# Patient Record
Sex: FEMALE | Race: WHITE | HISPANIC OR LATINO | Employment: UNEMPLOYED | URBAN - METROPOLITAN AREA
[De-identification: names, ages, dates, MRNs, and addresses within clinical notes are randomized per-mention and may not be internally consistent; named-entity substitution may affect disease eponyms.]

---

## 2017-01-06 ENCOUNTER — APPOINTMENT (OUTPATIENT)
Dept: CT IMAGING | Facility: CLINIC | Age: 22
End: 2017-01-06
Attending: EMERGENCY MEDICINE
Payer: COMMERCIAL

## 2017-01-06 ENCOUNTER — HOSPITAL ENCOUNTER (EMERGENCY)
Facility: CLINIC | Age: 22
Discharge: HOME | End: 2017-01-06
Attending: EMERGENCY MEDICINE | Admitting: EMERGENCY MEDICINE
Payer: COMMERCIAL

## 2017-01-06 ENCOUNTER — APPOINTMENT (OUTPATIENT)
Dept: ULTRASOUND IMAGING | Facility: CLINIC | Age: 22
End: 2017-01-06
Attending: EMERGENCY MEDICINE
Payer: COMMERCIAL

## 2017-01-06 VITALS
BODY MASS INDEX: 18.14 KG/M2 | OXYGEN SATURATION: 100 % | TEMPERATURE: 98.2 F | HEIGHT: 59 IN | SYSTOLIC BLOOD PRESSURE: 133 MMHG | RESPIRATION RATE: 18 BRPM | WEIGHT: 90 LBS | DIASTOLIC BLOOD PRESSURE: 80 MMHG | HEART RATE: 101 BPM

## 2017-01-06 DIAGNOSIS — N10 ACUTE PYELONEPHRITIS: ICD-10-CM

## 2017-01-06 DIAGNOSIS — R10.84 ABDOMINAL PAIN, GENERALIZED: ICD-10-CM

## 2017-01-06 DIAGNOSIS — R16.0 HEPATOMEGALY: ICD-10-CM

## 2017-01-06 DIAGNOSIS — N83.201 CYST OF RIGHT OVARY: ICD-10-CM

## 2017-01-06 LAB
ALBUMIN SERPL-MCNC: 4 G/DL (ref 3.4–5)
ALBUMIN UR-MCNC: 30 MG/DL
ALP SERPL-CCNC: 75 U/L (ref 40–150)
ALT SERPL W P-5'-P-CCNC: 22 U/L (ref 0–50)
ANION GAP SERPL CALCULATED.3IONS-SCNC: 9 MMOL/L (ref 3–14)
APPEARANCE UR: ABNORMAL
AST SERPL W P-5'-P-CCNC: 18 U/L (ref 0–45)
BACTERIA #/AREA URNS HPF: ABNORMAL /HPF
BASOPHILS # BLD AUTO: 0 10E9/L (ref 0–0.2)
BASOPHILS NFR BLD AUTO: 0.2 %
BILIRUB SERPL-MCNC: 0.7 MG/DL (ref 0.2–1.3)
BILIRUB UR QL STRIP: NEGATIVE
BUN SERPL-MCNC: 13 MG/DL (ref 7–30)
CALCIUM SERPL-MCNC: 9.4 MG/DL (ref 8.5–10.1)
CHLORIDE SERPL-SCNC: 102 MMOL/L (ref 94–109)
CO2 SERPL-SCNC: 26 MMOL/L (ref 20–32)
COLOR UR AUTO: YELLOW
CREAT BLD-MCNC: 0.8 MG/DL (ref 0.52–1.04)
CREAT SERPL-MCNC: 0.7 MG/DL (ref 0.52–1.04)
DIFFERENTIAL METHOD BLD: NORMAL
EOSINOPHIL # BLD AUTO: 0 10E9/L (ref 0–0.7)
EOSINOPHIL NFR BLD AUTO: 0.3 %
ERYTHROCYTE [DISTWIDTH] IN BLOOD BY AUTOMATED COUNT: 12.7 % (ref 10–15)
GFR SERPL CREATININE-BSD FRML MDRD: >90 ML/MIN/1.7M2
GFR SERPL CREATININE-BSD FRML MDRD: ABNORMAL ML/MIN/1.7M2
GLUCOSE SERPL-MCNC: 75 MG/DL (ref 70–99)
GLUCOSE UR STRIP-MCNC: NEGATIVE MG/DL
HCG UR QL: NEGATIVE
HCT VFR BLD AUTO: 40.5 % (ref 35–47)
HGB BLD-MCNC: 13.3 G/DL (ref 11.7–15.7)
HGB UR QL STRIP: NEGATIVE
IMM GRANULOCYTES # BLD: 0 10E9/L (ref 0–0.4)
IMM GRANULOCYTES NFR BLD: 0.3 %
KETONES UR STRIP-MCNC: 40 MG/DL
LEUKOCYTE ESTERASE UR QL STRIP: ABNORMAL
LYMPHOCYTES # BLD AUTO: 1.7 10E9/L (ref 0.8–5.3)
LYMPHOCYTES NFR BLD AUTO: 17.6 %
MCH RBC QN AUTO: 30.9 PG (ref 26.5–33)
MCHC RBC AUTO-ENTMCNC: 32.8 G/DL (ref 31.5–36.5)
MCV RBC AUTO: 94 FL (ref 78–100)
MONOCYTES # BLD AUTO: 0.6 10E9/L (ref 0–1.3)
MONOCYTES NFR BLD AUTO: 6.1 %
MUCOUS THREADS #/AREA URNS LPF: PRESENT /LPF
NEUTROPHILS # BLD AUTO: 7.3 10E9/L (ref 1.6–8.3)
NEUTROPHILS NFR BLD AUTO: 75.5 %
NITRATE UR QL: POSITIVE
NRBC # BLD AUTO: 0 10*3/UL
NRBC BLD AUTO-RTO: 0 /100
PH UR STRIP: 5.5 PH (ref 5–7)
PLATELET # BLD AUTO: 325 10E9/L (ref 150–450)
POTASSIUM SERPL-SCNC: 3.7 MMOL/L (ref 3.4–5.3)
PROT SERPL-MCNC: 9.1 G/DL (ref 6.8–8.8)
RBC # BLD AUTO: 4.3 10E12/L (ref 3.8–5.2)
RBC #/AREA URNS AUTO: 5 /HPF (ref 0–2)
SODIUM SERPL-SCNC: 137 MMOL/L (ref 133–144)
SP GR UR STRIP: 1.02 (ref 1–1.03)
SQUAMOUS #/AREA URNS AUTO: 6 /HPF (ref 0–1)
URN SPEC COLLECT METH UR: ABNORMAL
UROBILINOGEN UR STRIP-MCNC: NORMAL MG/DL (ref 0–2)
WBC # BLD AUTO: 9.6 10E9/L (ref 4–11)
WBC #/AREA URNS AUTO: >182 /HPF (ref 0–2)
WBC CLUMPS #/AREA URNS HPF: PRESENT /HPF

## 2017-01-06 PROCEDURE — 25000125 ZZHC RX 250: Performed by: EMERGENCY MEDICINE

## 2017-01-06 PROCEDURE — 74177 CT ABD & PELVIS W/CONTRAST: CPT

## 2017-01-06 PROCEDURE — 87088 URINE BACTERIA CULTURE: CPT | Performed by: EMERGENCY MEDICINE

## 2017-01-06 PROCEDURE — 25000128 H RX IP 250 OP 636: Performed by: EMERGENCY MEDICINE

## 2017-01-06 PROCEDURE — 93976 VASCULAR STUDY: CPT

## 2017-01-06 PROCEDURE — 96375 TX/PRO/DX INJ NEW DRUG ADDON: CPT

## 2017-01-06 PROCEDURE — 25000132 ZZH RX MED GY IP 250 OP 250 PS 637: Performed by: EMERGENCY MEDICINE

## 2017-01-06 PROCEDURE — 82565 ASSAY OF CREATININE: CPT

## 2017-01-06 PROCEDURE — 96361 HYDRATE IV INFUSION ADD-ON: CPT

## 2017-01-06 PROCEDURE — 99285 EMERGENCY DEPT VISIT HI MDM: CPT | Mod: 25

## 2017-01-06 PROCEDURE — 80053 COMPREHEN METABOLIC PANEL: CPT | Performed by: EMERGENCY MEDICINE

## 2017-01-06 PROCEDURE — 87086 URINE CULTURE/COLONY COUNT: CPT | Performed by: EMERGENCY MEDICINE

## 2017-01-06 PROCEDURE — 81025 URINE PREGNANCY TEST: CPT | Performed by: EMERGENCY MEDICINE

## 2017-01-06 PROCEDURE — 85025 COMPLETE CBC W/AUTO DIFF WBC: CPT | Performed by: EMERGENCY MEDICINE

## 2017-01-06 PROCEDURE — 96365 THER/PROPH/DIAG IV INF INIT: CPT

## 2017-01-06 PROCEDURE — 81001 URINALYSIS AUTO W/SCOPE: CPT | Performed by: EMERGENCY MEDICINE

## 2017-01-06 PROCEDURE — 87186 SC STD MICRODIL/AGAR DIL: CPT | Performed by: EMERGENCY MEDICINE

## 2017-01-06 PROCEDURE — 25500064 ZZH RX 255 OP 636: Performed by: EMERGENCY MEDICINE

## 2017-01-06 RX ORDER — IBUPROFEN 200 MG
400 TABLET ORAL ONCE
Status: COMPLETED | OUTPATIENT
Start: 2017-01-06 | End: 2017-01-06

## 2017-01-06 RX ORDER — SODIUM CHLORIDE 9 MG/ML
1000 INJECTION, SOLUTION INTRAVENOUS CONTINUOUS
Status: DISCONTINUED | OUTPATIENT
Start: 2017-01-06 | End: 2017-01-06 | Stop reason: HOSPADM

## 2017-01-06 RX ORDER — IOPAMIDOL 755 MG/ML
500 INJECTION, SOLUTION INTRAVASCULAR ONCE
Status: COMPLETED | OUTPATIENT
Start: 2017-01-06 | End: 2017-01-06

## 2017-01-06 RX ORDER — NORETHINDRONE ACETATE AND ETHINYL ESTRADIOL 1; 5 MG/1; UG/1
1 TABLET ORAL DAILY
COMMUNITY

## 2017-01-06 RX ORDER — HYDROMORPHONE HCL/0.9% NACL/PF 0.2MG/0.2
0.2 SYRINGE (ML) INTRAVENOUS ONCE
Status: COMPLETED | OUTPATIENT
Start: 2017-01-06 | End: 2017-01-06

## 2017-01-06 RX ORDER — CEFTRIAXONE 1 G/1
1 INJECTION, POWDER, FOR SOLUTION INTRAMUSCULAR; INTRAVENOUS ONCE
Status: COMPLETED | OUTPATIENT
Start: 2017-01-06 | End: 2017-01-06

## 2017-01-06 RX ORDER — ONDANSETRON 4 MG/1
4 TABLET, ORALLY DISINTEGRATING ORAL EVERY 8 HOURS PRN
Qty: 10 TABLET | Refills: 0 | Status: SHIPPED | OUTPATIENT
Start: 2017-01-06 | End: 2017-01-09

## 2017-01-06 RX ORDER — CEPHALEXIN 500 MG/1
500 CAPSULE ORAL 4 TIMES DAILY
Qty: 40 CAPSULE | Refills: 0 | Status: SHIPPED | OUTPATIENT
Start: 2017-01-06 | End: 2017-01-16

## 2017-01-06 RX ORDER — OXYCODONE HYDROCHLORIDE 5 MG/1
5 TABLET ORAL EVERY 6 HOURS PRN
Qty: 10 TABLET | Refills: 0 | Status: SHIPPED | OUTPATIENT
Start: 2017-01-06 | End: 2017-01-31

## 2017-01-06 RX ORDER — ONDANSETRON 2 MG/ML
4 INJECTION INTRAMUSCULAR; INTRAVENOUS
Status: COMPLETED | OUTPATIENT
Start: 2017-01-06 | End: 2017-01-06

## 2017-01-06 RX ORDER — OXYCODONE HYDROCHLORIDE 5 MG/1
5 TABLET ORAL ONCE
Status: COMPLETED | OUTPATIENT
Start: 2017-01-06 | End: 2017-01-06

## 2017-01-06 RX ORDER — IBUPROFEN 200 MG
400 TABLET ORAL EVERY 8 HOURS PRN
Qty: 1 TABLET | Refills: 0 | Status: SHIPPED | OUTPATIENT
Start: 2017-01-06 | End: 2017-01-31

## 2017-01-06 RX ADMIN — SODIUM CHLORIDE 51 ML: 9 INJECTION, SOLUTION INTRAVENOUS at 08:38

## 2017-01-06 RX ADMIN — HYDROMORPHONE HYDROCHLORIDE 0.2 MG: 10 INJECTION, SOLUTION INTRAMUSCULAR; INTRAVENOUS; SUBCUTANEOUS at 10:31

## 2017-01-06 RX ADMIN — SODIUM CHLORIDE 1000 ML: 9 INJECTION, SOLUTION INTRAVENOUS at 08:15

## 2017-01-06 RX ADMIN — IOPAMIDOL 45 ML: 755 INJECTION, SOLUTION INTRAVENOUS at 08:38

## 2017-01-06 RX ADMIN — ONDANSETRON 4 MG: 2 INJECTION INTRAMUSCULAR; INTRAVENOUS at 08:16

## 2017-01-06 RX ADMIN — OXYCODONE HYDROCHLORIDE 5 MG: 5 TABLET ORAL at 12:27

## 2017-01-06 RX ADMIN — CEFTRIAXONE 1 G: 1 INJECTION, POWDER, FOR SOLUTION INTRAMUSCULAR; INTRAVENOUS at 09:15

## 2017-01-06 RX ADMIN — IBUPROFEN 400 MG: 200 TABLET, FILM COATED ORAL at 10:31

## 2017-01-06 ASSESSMENT — ENCOUNTER SYMPTOMS
DYSURIA: 0
CHILLS: 0
NAUSEA: 1
FEVER: 0
ANAL BLEEDING: 0
BLOOD IN STOOL: 0
ABDOMINAL PAIN: 1
VOMITING: 1
HEMATURIA: 0

## 2017-01-06 NOTE — ED PROVIDER NOTES
"  History     Chief Complaint:  Abdominal Pain      HPI   Sophia Zamudio is a 21 year old female who presents to the ED evaluation of abdominal pain. The patient states that 5 days ago on Monday 01/01/2016, she began to feel \"sick\". The following day, she additionally developed pain diffusely to her right abdomen with nausea with multiple episodes of non-bloody vomiting. Her abdominal pain and vomiting have been present every day since onset with no consistent exacerbating or alleviating factors. She states that yesterday evening her abdominal pain seemed to localize to the right side of her abdomen which has seen been constantly present and worsened during episodes of coughing. She has been treating her symptom with ibuprofen, taken most recently 36 hours ago. The patient denies any fevers, cough, sore throat, dysuria, hematuria, vaginal bleeding/discharge, rashes, or other changes in bowel/bladder habits. She has had no abdominal surgeries.     Of note, the patient has been experiencing back spasms for the past 2 weeks and recently saw her primary care physician Dr. Nik Lombardo of [facility].     Allergies:  NKDA      Medications:    Estradiol     Past Medical History:    The patient denies any significant past medical history.     Past Surgical History:    The patient does not have any pertinent past surgical history.     Family History:    No past pertinent family history.     Social History:  PCP is Dr. Nik Lombardo of [facility]  The patient works as a PCA.   The patient's parents recently moved to [place].   Marital Status:  Single [1]  Negative for tobacco use.  Negative for alcohol use.     Review of Systems   Constitutional: Negative for fever and chills.   Gastrointestinal: Positive for nausea, vomiting and abdominal pain. Negative for blood in stool and anal bleeding.   Genitourinary: Negative for dysuria and hematuria.   All other systems reviewed and are " "negative.    Physical Exam   First Vitals:  BP: 129/79 mmHg  Pulse: 101  Temp: 98.2  F (36.8  C)  Resp: 18  Height: 149.9 cm (4' 11\")  Weight: 40.824 kg (90 lb)  SpO2: 99 %      Physical Exam    Nursing note and vitals reviewed.    Constitutional: Pleasant and well groomed. Lean habitus.          HENT:    Mouth/Throat: Oropharynx is without swelling or erythema. Oral mucosa moist.    Eyes: Conjunctivae normal are normal. No scleral icterus.    Neck: Neck supple.   Cardiovascular: Normal rate, regular rhythm and intact distal pulses.    Pulmonary/Chest: Effort normal and breath sounds normal.   Abdominal: Soft.  No distension. There diffuse right sided tenderness with guarding.Bilateral lumbar tenderness to palpation/percussion.   Musculoskeletal:  No edema, No calf tenderness  Neurological:Alert. Coordination normal.   Skin: Skin is warm and dry.   Psychiatric: Normal mood and affect.       Emergency Department Course   Imaging:  Radiographic findings were communicated with the patient who voiced understanding of the findings.    CT Abdomen/Pelvis with IV contrast:   1. Patchy somewhat mosaic enhancement pattern in the liver. There is  hepatomegaly. In an otherwise healthy patient of this age, this likely  is the result of hepatitis. Differential diagnosis includes  Budd-Chiari/venoocclusive disease and passive hepatic congestion.  There are no signs of venoocclusive disease, and passive hepatic  congestion would be unusual in an otherwise healthy young patient.  Liver MRI may be helpful for further evaluation in followup.  2. Remainder of the scan is unremarkable. The appendix cannot  definitely be identified in this thin patient, but there are no  inflammatory changes in the right lower quadrant.  3. 2.6 cm somewhat cystic-appearing lesion in the right pelvis is  likely an ovarian cyst. As per radiology.    US Abdomen Pelvis vascular study:   No evidence of Budd-Chiari. As per radiology.    "   Laboratory:  0808 POCT Creatinine: 0.8, GFR >90    CBC: WBC: 9.6, HGB: 13.3, PLT: 325  CMP: Protein total 9.1(H), o/w WNL (Creatinine: 0.70)     UA with micro: ketones 40, protein albumin, positive nitrite, large Leukocyte Esterase, WBC >182(H), RBC 5(H), WBC clumps present, moderate bacteria, Squamous epithelial  6(H), mucous present, o/w negative.     HCG: Negative    Interventions:  0815 NS 1L IV  0816 Zofran, 4 mg, IV injection    1003 Ceftriaxone 1g IV  1031 Ibuprofen 400mg oral    Dilaudid 0.2mg IV    1227 Oxycodone IR 5mg Oral     Emergency Department Course:  Nursing notes and vitals reviewed. I performed an exam of the patient as documented above.     Blood drawn. This was sent to the lab for further testing, results above.    The patient provided a urine sample here in the emergency department. This was sent for laboratory testing, findings above.     The patient was sent for a CT scan of her abdomen/pelvis while in the emergency department, findings above.      1007 I reevaluated the patient and provided an update in regards to her ED course.      1014 I consulted with Dr. Vásquez, radiology.     1027 I consulted with Dr. Vásquez who recommended consultation with hepatology.     1119 I consulted with Dr. Espino, hepatology at the [facility] who recommended a vascular abdomen ultrasound.     The patient was sent for a limited abdomen/pelvis vascular ultrasound while in the emergency department, findings above.      1309 I consulted with the patient's mother regarding the patient's symptoms and ED course. The mother informed me that the patient uses Kratom which on review is an herbal substance made in [facility].      1341 I reassessed the patient and she is comfortable with discharge to home. She has been able to eat and drink without difficulty. She is sitting upright and talking with a friend.     Findings and plan explained to the Patient. Patient discharged home with instructions regarding supportive care,  medications, and reasons to return. The importance of close follow-up was reviewed.     I personally reviewed the laboratory results with the Patient and answered all related questions prior to discharge.     1408 I called and left the patient a message regarding an incidental ovarian cyst found on her CT. She called back and all questions were answered. She will have a follow up US in in 4-6 weeks.      Impression & Plan    Medical Decision Making:  Sophia Zamudio is a 21 year old female who presents with nausea, vomiting, and right sided pain as described above. The differential diagnosis was broad included, but not limited to; pyelonephritis, nephrolithiasis, ectopic pregnancy, bilary colic, cholecystitis, appendicitis. ED evaluation is as noted above with a urine diagnostic of a urinary tract infection, however with the severity of her right sided pain; I felt a CT scan was warranted. The CT did not show an appendicitis or findings concerning for pyelonephritis or cholecystitis, however she did have the liver abnormality as described above. The patient is feeling better, her pain is adequately controlled, and she is tolerating fluids here in the ED. I consulted with the radiologist about the findings in the differential and then consulted with Dr. Rin Espino of the hepatologist clinic who requested a doppler ultrasound of the abdomen to rule out Budd-Chiari. She felt that if this was negative, the patient could be discharged to home with treatment of the pyelonephritis and to follow up in her clinic which she will arrange. Of note, the patient's initially denied any substance abuse/use, however the patient did give my permission and asked me specifically to call her mother who disclosed to me that the patient has been taking Kratom which is a medicinal supplement for [facility]. The patient states that she is not using this currently. I expressed to her my concern that this could cause liver disease,  although we do not know that this is related to her findings today; but I warned her against using this until she has further consultation with the hepatologist. She understands to return with new/worsening symptoms or failure for her to respond to treatment for the kidney infection. She will follow up in 2 days with her primary care physician and was instructed to contact the hepatology clinic to arrange for an appointment.     Disposition:  1. (N10) Acute pyelonephritis  2. (R10.84) Abdominal pain, generalized  3. (R16.0) Hepatomegaly  4. (N83.201) Cyst of right ovary    Plan:  1. The patient received standard EPPA discharge instructions for kidney infection.   2. Follow up as noted above for the liver consultation    Discharge Medications:  Discharge Medication List as of 1/6/2017  2:05 PM      START taking these medications    Details   cephALEXin (KEFLEX) 500 MG capsule Take 1 capsule (500 mg) by mouth 4 times daily for 10 days, Disp-40 capsule, R-0, Local Print      ondansetron (ZOFRAN ODT) 4 MG ODT tab Take 1 tablet (4 mg) by mouth every 8 hours as needed for nausea, Disp-10 tablet, R-0, Local Print      oxyCODONE (ROXICODONE) 5 MG IR tablet Take 1 tablet (5 mg) by mouth every 6 hours as needed for moderate to severe pain, Disp-10 tablet, R-0, Local Print      ibuprofen (ADVIL/MOTRIN) 200 MG tablet Take 2 tablets (400 mg) by mouth every 8 hours as needed for mild pain, Disp-1 tablet, R-0, Local Print           IMomo, am serving as a scribe on 1/6/2017 at 7:31 AM to personally document services performed by Beronica Pena* based on my observations and the provider's statements to me.      Momo Mckay  1/6/2017   [facility] EMERGENCY DEPARTMENT        Beronica Pena MD  01/06/17 [id number]

## 2017-01-06 NOTE — DISCHARGE INSTRUCTIONS
Discharge Instructions  Urinary Tract Infection  You have urinary tract infection, or UTI. The urinary tract includes the kidneys (which make urine), ureters (the tubes that carry urine from the kidneys to the bladder), the bladder (which stores urine), and urethra (the tube that carries urine out of the bladder).  Urinary tract infections occur when bacteria travel up the urethra into the bladder. We suspect a UTI based on chemical and microscopic findings in your urine, but if there is a question about your findings, we will do a culture to see if bacteria grow. A urine culture takes several days. You should always follow-up with your primary physician to find out about results of your culture if one was done.   Return to the Emergency Department if:    You have severe back pain.    You are vomiting so that you can t take your medicine, or have signs of dehydration (such as urinating less than 3 times per day).    You have fever over 101.5 degrees F.    You have significant confusion or are very weak, or feel very ill.    Your child seems much more ill, won t wake up, won t respond right, or is crying for a long time and won t calm down.    Your child is showing signs of dehydration, Signs of dehydration can be:  o Your infant has had no wet diapers in 4-5 hours.  o Your older child has not passed urine in 6-8 hours.  o Your infant or child starts to have dry mouth and lips, or no saliva or tears.    Follow-up with your doctor:     Children under 24 months need to be seen by their regular doctor within one week after a diagnosis of a UTI. It may be necessary to do some imaging tests to look at the child s kidney or bladder.    You should begin to feel better within 24 - 48 hours of starting your antibiotic.  If you do not, you need to be seen again.      Treatment:     You will be treated with an antibiotic to kill the bacteria. We have to make an educated guess as to which antibiotic will work for your infection.  "In most healthy people, we can guess right almost all of the time. Sometimes a culture is done to show which antibiotics will work. This usually takes 2-3 days. When the culture is done, we may have to contact you to put you on a different antibiotic.    Take a pain medication such as Tylenol  (acetaminophen), Advil  (ibuprofen), Nuprin  (ibuprofen), or Aleve  (naproxen). If you have been given a narcotic such as Vicodin  (hydrocodone with acetaminophen), Percocet  (oxycodone with acetaminophen), or codeine, do not drive for four hours after you have taken it. If the narcotic contains Tylenol  (acetaminophen), do not take Tylenol  with it. All narcotics will cause constipation, so eat a high fiber diet.      Pyridium  (phenazopyridine) or Uristat  (phenazopyridine) is a prescription medication that numbs the bladder to reduce the burning pain of some UTIs.  The same medication is available in a non-prescription version called Azo-Standard  (phenazopyridine), Urodol  (phenazopyridine), or other brand names. This medication will change the color of the urine and tears (usually blue or orange). If you wear contacts, do not wear them while taking this medication as they may be stained by the medication.    Antibiotic Warning:     If you have been placed on antibiotics - watch for signs of allergic reaction.  These include rash, lip swelling, difficulty breathing, wheezing, and dizziness.  If you develop any of these symptoms, stop the antibiotic immediately and go to an emergency room or urgent care for evaluation.    Probiotics: If you have been given an antibiotic, you may want to also take a probiotic pill or eat yogurt with live cultures. Probiotics have \"good bacteria\" to help your intestines stay healthy. Studies have shown that probiotics help prevent diarrhea and other intestine problems (including C. diff infection) when you take antibiotics. You can buy these without a prescription in the pharmacy section of " the store.   If you were given a prescription for medicine here today, be sure to read all of the information (including the package insert) that comes with your prescription.  This will include important information about the medicine, its side effects, and any warnings that you need to know about.  The pharmacist who fills the prescription can provide more information and answer questions you may have about the medicine.  If you have questions or concerns that the pharmacist cannot address, please call or return to the Emergency Department.   Opioid Medication Information    Pain medications are among the most commonly prescribed medicines, so we are including this information for all our patients. If you did not receive pain medication or get a prescription for pain medicine, you can ignore it.     You may have been given a prescription for an opioid (narcotic) pain medicine and/or have received a pain medicine while here in the Emergency Department. These medicines can make you drowsy or impaired. You must not drive, operate dangerous equipment, or engage in any other dangerous activities while taking these medications. If you drive while taking these medications, you could be arrested for DUI, or driving under the influence. Do not drink any alcohol while you are taking these medications.     Opioid pain medications can cause addiction. If you have a history of chemical dependency of any type, you are at a higher risk of becoming addicted to pain medications.  Only take these prescribed medications to treat your pain when all other options have been tried. Take it for as short a time and as few doses as possible. Store your pain pills in a secure place, as they are frequently stolen and provide a dangerous opportunity for children or visitors in your house to start abusing these powerful medications. We will not replace any lost or stolen medicine.  As soon as your pain is better, you should flush all your  remaining medication.     Many prescription pain medications contain Tylenol  (acetaminophen), including Vicodin , Tylenol #3 , Norco , Lortab , and Percocet .  You should not take any extra pills of Tylenol  if you are using these prescription medications or you can get very sick.  Do not ever take more than 3000 mg of acetaminophen in any 24 hour period.    All opioids tend to cause constipation. Drink plenty of water and eat foods that have a lot of fiber, such as fruits, vegetables, prune juice, apple juice and high fiber cereal.  Take a laxative if you don t move your bowels at least every other day. Miralax , Milk of Magnesia, Colace , or Senna  can be used to keep you regular.      Remember that you can always come back to the Emergency Department if you are not able to see your regular doctor in the amount of time listed above, if you get any new symptoms, or if there is anything that worries you.    Opioid Medication Discharge Instructions    You have been given a prescription for an opioid (narcotic) pain medicine and/or have   received a pain medicine while here in the emergency department. These medicines can make you drowsy or impaired.     You must not drive, operate dangerous equipment, or   engage in any other dangerous activities while taking these medications. If you drive while taking these medications, you could be arrested for DUI, or driving under the   influence. Do not drink any alcohol while you are taking these medications.     Opioid pain medications can cause addiction. If you have a history of chemical   dependency of any type, you are at a higher risk of becoming addicted to pain   medications. Only take these prescribed medications to treat your pain when all other   options have been tried. Take it for as short a time and as few doses as possible.     Store your pain pills in a secure place, as they are frequently stolen and provide a dangerous opportunity for children or visitors in  your house to start abusing these powerful medications. We will not replace any lost or stolen medicine.     As soon as your pain is better, you should safely dispose of all your remaining medication.     Many prescription pain medications contain Tylenol (acetaminophen), including Vicodin, Tylenol #3, Norco, Lortab, and Percocet. You should not take any extra pills of Tylenol if you are using these prescription medications or you can get very sick. Do not ever take more than 4000 mg of acetaminophen in any 24 hour period.    All opioids tend to cause constipation. Drink plenty of water and eat foods that have   a lot of fiber, such as fruits, vegetables, prune juice, apple juice and high fiber cereal.   Take a laxative if you don t move your bowels at least every other day. Miralax, Milk of   Magnesia, Colace, or Senna can be used to keep you regular.       Do not use alcohol, tylenol containing products or Kratom.   Expect call from Hepatology clinic at [facility] for follow up.

## 2017-01-06 NOTE — ED AVS SNAPSHOT
[facility] Emergency Department    [address]    [facility] [address]    Phone:  [phone]    Fax:  [phone]                                       Sophia Zamudio   MRN: [id number]    Department:  [facility] Emergency Department   Date of Visit:  1/6/2017           Patient Information     Date Of Birth          1995        Your diagnoses for this visit were:     Acute pyelonephritis     Abdominal pain, generalized Right sided    Hepatomegaly Liver abnormality per CT       You were seen by Beronica Pena MD.      Follow-up Information     Follow up with Nik Lombardo MD In 3 days.    Specialty:  Pediatrics    Contact information:    [facility]  [address]  [address]  [facility] [id number] [phone]          Follow up with Rin Espino MD.    Specialty:  Internal Medicine    Why:  Hepatology  Clinic at [facility]    Contact information:    [place] [id number] [facility] [address]  [facility] [address]  [phone]          Discharge Instructions       Discharge Instructions  Urinary Tract Infection  You have urinary tract infection, or UTI. The urinary tract includes the kidneys (which make urine), ureters (the tubes that carry urine from the kidneys to the bladder), the bladder (which stores urine), and urethra (the tube that carries urine out of the bladder).  Urinary tract infections occur when bacteria travel up the urethra into the bladder. We suspect a UTI based on chemical and microscopic findings in your urine, but if there is a question about your findings, we will do a culture to see if bacteria grow. A urine culture takes several days. You should always follow-up with your primary physician to find out about results of your culture if one was done.   Return to the Emergency Department if:    You have severe back pain.    You are vomiting so that you can t take your medicine, or have signs of  dehydration (such as urinating less than 3 times per day).    You have fever over 101.5 degrees F.    You have significant confusion or are very weak, or feel very ill.    Your child seems much more ill, won t wake up, won t respond right, or is crying for a long time and won t calm down.    Your child is showing signs of dehydration, Signs of dehydration can be:  o Your infant has had no wet diapers in 4-5 hours.  o Your older child has not passed urine in 6-8 hours.  o Your infant or child starts to have dry mouth and lips, or no saliva or tears.    Follow-up with your doctor:     Children under 24 months need to be seen by their regular doctor within one week after a diagnosis of a UTI. It may be necessary to do some imaging tests to look at the child s kidney or bladder.    You should begin to feel better within 24 - 48 hours of starting your antibiotic.  If you do not, you need to be seen again.      Treatment:     You will be treated with an antibiotic to kill the bacteria. We have to make an educated guess as to which antibiotic will work for your infection. In most healthy people, we can guess right almost all of the time. Sometimes a culture is done to show which antibiotics will work. This usually takes 2-3 days. When the culture is done, we may have to contact you to put you on a different antibiotic.    Take a pain medication such as Tylenol  (acetaminophen), Advil  (ibuprofen), Nuprin  (ibuprofen), or Aleve  (naproxen). If you have been given a narcotic such as Vicodin  (hydrocodone with acetaminophen), Percocet  (oxycodone with acetaminophen), or codeine, do not drive for four hours after you have taken it. If the narcotic contains Tylenol  (acetaminophen), do not take Tylenol  with it. All narcotics will cause constipation, so eat a high fiber diet.      Pyridium  (phenazopyridine) or Uristat  (phenazopyridine) is a prescription medication that numbs the bladder to reduce the burning pain of some  "UTIs.  The same medication is available in a non-prescription version called Azo-Standard  (phenazopyridine), Urodol  (phenazopyridine), or other brand names. This medication will change the color of the urine and tears (usually blue or orange). If you wear contacts, do not wear them while taking this medication as they may be stained by the medication.    Antibiotic Warning:     If you have been placed on antibiotics - watch for signs of allergic reaction.  These include rash, lip swelling, difficulty breathing, wheezing, and dizziness.  If you develop any of these symptoms, stop the antibiotic immediately and go to an emergency room or urgent care for evaluation.    Probiotics: If you have been given an antibiotic, you may want to also take a probiotic pill or eat yogurt with live cultures. Probiotics have \"good bacteria\" to help your intestines stay healthy. Studies have shown that probiotics help prevent diarrhea and other intestine problems (including C. diff infection) when you take antibiotics. You can buy these without a prescription in the pharmacy section of the store.   If you were given a prescription for medicine here today, be sure to read all of the information (including the package insert) that comes with your prescription.  This will include important information about the medicine, its side effects, and any warnings that you need to know about.  The pharmacist who fills the prescription can provide more information and answer questions you may have about the medicine.  If you have questions or concerns that the pharmacist cannot address, please call or return to the Emergency Department.   Opioid Medication Information    Pain medications are among the most commonly prescribed medicines, so we are including this information for all our patients. If you did not receive pain medication or get a prescription for pain medicine, you can ignore it.     You may have been given a prescription for an " opioid (narcotic) pain medicine and/or have received a pain medicine while here in the Emergency Department. These medicines can make you drowsy or impaired. You must not drive, operate dangerous equipment, or engage in any other dangerous activities while taking these medications. If you drive while taking these medications, you could be arrested for DUI, or driving under the influence. Do not drink any alcohol while you are taking these medications.     Opioid pain medications can cause addiction. If you have a history of chemical dependency of any type, you are at a higher risk of becoming addicted to pain medications.  Only take these prescribed medications to treat your pain when all other options have been tried. Take it for as short a time and as few doses as possible. Store your pain pills in a secure place, as they are frequently stolen and provide a dangerous opportunity for children or visitors in your house to start abusing these powerful medications. We will not replace any lost or stolen medicine.  As soon as your pain is better, you should flush all your remaining medication.     Many prescription pain medications contain Tylenol  (acetaminophen), including Vicodin , Tylenol #3 , Norco , Lortab , and Percocet .  You should not take any extra pills of Tylenol  if you are using these prescription medications or you can get very sick.  Do not ever take more than 3000 mg of acetaminophen in any 24 hour period.    All opioids tend to cause constipation. Drink plenty of water and eat foods that have a lot of fiber, such as fruits, vegetables, prune juice, apple juice and high fiber cereal.  Take a laxative if you don t move your bowels at least every other day. Miralax , Milk of Magnesia, Colace , or Senna  can be used to keep you regular.      Remember that you can always come back to the Emergency Department if you are not able to see your regular doctor in the amount of time listed above, if you get any  new symptoms, or if there is anything that worries you.    Opioid Medication Discharge Instructions    You have been given a prescription for an opioid (narcotic) pain medicine and/or have   received a pain medicine while here in the emergency department. These medicines can make you drowsy or impaired.     You must not drive, operate dangerous equipment, or   engage in any other dangerous activities while taking these medications. If you drive while taking these medications, you could be arrested for DUI, or driving under the   influence. Do not drink any alcohol while you are taking these medications.     Opioid pain medications can cause addiction. If you have a history of chemical   dependency of any type, you are at a higher risk of becoming addicted to pain   medications. Only take these prescribed medications to treat your pain when all other   options have been tried. Take it for as short a time and as few doses as possible.     Store your pain pills in a secure place, as they are frequently stolen and provide a dangerous opportunity for children or visitors in your house to start abusing these powerful medications. We will not replace any lost or stolen medicine.     As soon as your pain is better, you should safely dispose of all your remaining medication.     Many prescription pain medications contain Tylenol (acetaminophen), including Vicodin, Tylenol #3, Norco, Lortab, and Percocet. You should not take any extra pills of Tylenol if you are using these prescription medications or you can get very sick. Do not ever take more than 4000 mg of acetaminophen in any 24 hour period.    All opioids tend to cause constipation. Drink plenty of water and eat foods that have   a lot of fiber, such as fruits, vegetables, prune juice, apple juice and high fiber cereal.   Take a laxative if you don t move your bowels at least every other day. Miralax, Milk of   Magnesia, Colace, or Senna can be used to keep you  regular.       Do not use alcohol, tylenol containing products or Kratom.   Expect call from Hepatology clinic at [facility] for follow up.     24 Hour Appointment Hotline       To make an appointment at any [facility] clinic, call [phone] ([phone]). If you don't have a family doctor or clinic, we will help you find one. [facility] clinics are conveniently located to serve the needs of you and your family.             Review of your medicines      START taking        Dose / Directions Last dose taken    cephALEXin 500 MG capsule   Commonly known as:  KEFLEX   Dose:  500 mg   Quantity:  40 capsule        Take 1 capsule (500 mg) by mouth 4 times daily for 10 days   Refills:  0        ibuprofen 200 MG tablet   Commonly known as:  ADVIL/MOTRIN   Dose:  400 mg   Quantity:  1 tablet        Take 2 tablets (400 mg) by mouth every 8 hours as needed for mild pain   Refills:  0        ondansetron 4 MG ODT tab   Commonly known as:  ZOFRAN ODT   Dose:  4 mg   Quantity:  10 tablet        Take 1 tablet (4 mg) by mouth every 8 hours as needed for nausea   Refills:  0        oxyCODONE 5 MG IR tablet   Commonly known as:  ROXICODONE   Dose:  5 mg   Quantity:  10 tablet        Take 1 tablet (5 mg) by mouth every 6 hours as needed for moderate to severe pain   Refills:  0          Our records show that you are taking the medicines listed below. If these are incorrect, please call your family doctor or clinic.        Dose / Directions Last dose taken    norethindrone-ethinyl estradiol 1-5 MG-MCG per tablet   Commonly known as:  FEMHRT 1/5   Dose:  1 tablet        Take 1 tablet by mouth daily   Refills:  0                Prescriptions were sent or printed at these locations (4 Prescriptions)                   Other Prescriptions                Printed at Department/Unit printer (4 of 4)         cephALEXin (KEFLEX) 500 MG capsule               ondansetron (ZOFRAN ODT) 4 MG ODT tab               oxyCODONE  (ROXICODONE) 5 MG IR tablet               ibuprofen (ADVIL/MOTRIN) 200 MG tablet                Procedures and tests performed during your visit     Abdomen pelvis vascular study limited US    CBC with platelets differential    CT Abdomen Pelvis w Contrast    Comprehensive metabolic panel    Creatinine POCT    HCG qualitative urine    ISTAT creatinine    Pulse oximetry nursing    UA with Microscopic    Urine Culture Aerobic Bacterial      Orders Needing Specimen Collection     None      Pending Results     Date and Time Order Name Status Description    1/6/2017 0910 Urine Culture Aerobic Bacterial Preliminary             Pending Culture Results     Date and Time Order Name Status Description    1/6/2017 0910 Urine Culture Aerobic Bacterial Preliminary        Test Results from your hospital stay           1/6/2017  8:28 AM - Interface, Flexilab Results      Component Results     Component Value Ref Range & Units Status    WBC 9.6 4.0 - 11.0 10e9/L Final    RBC Count 4.30 3.8 - 5.2 10e12/L Final    Hemoglobin 13.3 11.7 - 15.7 g/dL Final    Hematocrit 40.5 35.0 - 47.0 % Final    MCV 94 78 - 100 fl Final    MCH 30.9 26.5 - 33.0 pg Final    MCHC 32.8 31.5 - 36.5 g/dL Final    RDW 12.7 10.0 - 15.0 % Final    Platelet Count 325 150 - 450 10e9/L Final    Diff Method Automated Method  Final    % Neutrophils 75.5 % Final    % Lymphocytes 17.6 % Final    % Monocytes 6.1 % Final    % Eosinophils 0.3 % Final    % Basophils 0.2 % Final    % Immature Granulocytes 0.3 % Final    Nucleated RBCs 0 0 /100 Final    Absolute Neutrophil 7.3 1.6 - 8.3 10e9/L Final    Absolute Lymphocytes 1.7 0.8 - 5.3 10e9/L Final    Absolute Monocytes 0.6 0.0 - 1.3 10e9/L Final    Absolute Eosinophils 0.0 0.0 - 0.7 10e9/L Final    Absolute Basophils 0.0 0.0 - 0.2 10e9/L Final    Abs Immature Granulocytes 0.0 0 - 0.4 10e9/L Final    Absolute Nucleated RBC 0.0  Final         1/6/2017  8:48 AM - Interface, Flexilab Results      Component Results      Component Value Ref Range & Units Status    Sodium 137 133 - 144 mmol/L Final    Potassium 3.7 3.4 - 5.3 mmol/L Final    Chloride 102 94 - 109 mmol/L Final    Carbon Dioxide 26 20 - 32 mmol/L Final    Anion Gap 9 3 - 14 mmol/L Final    Glucose 75 70 - 99 mg/dL Final    Urea Nitrogen 13 7 - 30 mg/dL Final    Creatinine 0.70 0.52 - 1.04 mg/dL Final    GFR Estimate >90  Non  GFR Calc   >60 mL/min/1.7m2 Final    GFR Estimate If Black >90   GFR Calc   >60 mL/min/1.7m2 Final    Calcium 9.4 8.5 - 10.1 mg/dL Final    Bilirubin Total 0.7 0.2 - 1.3 mg/dL Final    Albumin 4.0 3.4 - 5.0 g/dL Final    Protein Total 9.1 (H) 6.8 - 8.8 g/dL Final    Alkaline Phosphatase 75 40 - 150 U/L Final    ALT 22 0 - 50 U/L Final    AST 18 0 - 45 U/L Final         1/6/2017  8:19 AM - Interface, Flexilab Results      Component Results     Component Value Ref Range & Units Status    HCG Qual Urine Negative NEG Final         1/6/2017  1:33 PM - Interface, Radiant Ib      Narrative     CT ABDOMEN AND PELVIS WITH CONTRAST  1/6/2017 8:43 AM     HISTORY: Right-sided abdomen pain.    TECHNIQUE: 45 mL Isovue-370 IV.  Radiation dose for this scan was  reduced using automated exposure control, adjustment of the mA and/or  kV according to patient size, or iterative reconstruction technique.    COMPARISON: None.    FINDINGS: Scans through the lung bases are normal.    There is moderate hepatomegaly and a patchy somewhat mosaic  enhancement pattern that is abnormal. This type of enhancement pattern  can be seen in Budd-Chiari, venoocclusive disease, and passive hepatic  congestion. This patient is young and does not appear to have heart  disease and no evidence of Budd-Chiari. Hepatitis is in the  differential diagnosis as well.    No adrenal lesions. Kidneys are normal. No retroperitoneal adenopathy.    Scans through the pelvis demonstrate a decompressed bladder.  Low-density area in the right pelvis could represent a bowel  loop or a  2.6 cm ovarian cyst.    There is no evidence of diverticulitis. The appendix is difficult to  identify, but no secondary signs of appendicitis. No free air. Small  amount of free fluid in the pelvis not uncommon in a female patient of  this age.         Impression     IMPRESSION:  1. Patchy somewhat mosaic enhancement pattern in the liver. There is  hepatomegaly. In an otherwise healthy patient of this age, this likely  is the result of hepatitis. Differential diagnosis includes  Budd-Chiari/venoocclusive disease and passive hepatic congestion.  There are no signs of venoocclusive disease, and passive hepatic  congestion would be unusual in an otherwise healthy young patient.  Liver MRI may be helpful for further evaluation in followup.  2. Remainder of the scan is unremarkable. The appendix cannot  definitely be identified in this thin patient, but there are no  inflammatory changes in the right lower quadrant.  3. 2.6 cm somewhat cystic-appearing lesion in the right pelvis is  likely an ovarian cyst.    GIBSON VINSON MD               1/6/2017  8:18 AM - Interface, Flexilab Results      Component Results     Component Value Ref Range & Units Status    Creatinine 0.8 0.52 - 1.04 mg/dL Final    GFR Estimate >90 >60 mL/min/1.7m2 Final    GFR Estimate If Black >90 >60 mL/min/1.7m2 Final         1/6/2017  8:37 AM - Interface, Flexilab Results      Component Results     Component Value Ref Range & Units Status    Color Urine Yellow  Final    Appearance Urine Slightly Cloudy  Final    Glucose Urine Negative NEG mg/dL Final    Bilirubin Urine Negative NEG Final    Ketones Urine 40 (A) NEG mg/dL Final    Specific Gravity Urine 1.016 1.003 - 1.035 Final    Blood Urine Negative NEG Final    pH Urine 5.5 5.0 - 7.0 pH Final    Protein Albumin Urine 30 (A) NEG mg/dL Final    Urobilinogen mg/dL Normal 0.0 - 2.0 mg/dL Final    Nitrite Urine Positive (A) NEG Final    Leukocyte Esterase Urine Large (A) NEG Final    Source  Midstream Urine  Final    WBC Urine >182 (H) 0 - 2 /HPF Final    RBC Urine 5 (H) 0 - 2 /HPF Final    WBC Clumps Present (A) NEG /HPF Final    Bacteria Urine Moderate (A) NEG /HPF Final    Squamous Epithelial /HPF Urine 6 (H) 0 - 1 /HPF Final    Mucous Urine Present (A) NEG /LPF Final         1/6/2017 11:34 AM - Interface, Flexilab Results      Component Results     Component    Specimen Description    Midstream Urine    Special Requests    Specimen received in preservative    Culture Micro    Pending    Micro Report Status    Pending         1/6/2017  1:34 PM - Interface, Radiant Ib      Narrative     ULTRASOUND ABDOMEN OR PELVIS DOPPLER LIMITED  1/6/2017 12:00 PM     HISTORY: Enlarged liver. Evaluate for Budd-Chiari.    COMPARISON: None.    FINDINGS: The inferior vena cava and hepatic veins demonstrate normal  patency and hepatofugal flow. Portal vein, hepatic artery, and splenic  veins are normal with normal flow.        Impression     IMPRESSION: No evidence of Budd-Chiari.    GIBSON VINSON MD                Clinical Quality Measure: Blood Pressure Screening     Your blood pressure was checked while you were in the emergency department today. The last reading we obtained was  BP: 124/59 mmHg . Please read the guidelines below about what these numbers mean and what you should do about them.  If your systolic blood pressure (the top number) is less than 120 and your diastolic blood pressure (the bottom number) is less than 80, then your blood pressure is normal. There is nothing more that you need to do about it.  If your systolic blood pressure (the top number) is 120-139 or your diastolic blood pressure (the bottom number) is 80-89, your blood pressure may be higher than it should be. You should have your blood pressure rechecked within a year by a primary care provider.  If your systolic blood pressure (the top number) is 140 or greater or your diastolic blood pressure (the bottom number) is 90 or greater, you  "may have high blood pressure. High blood pressure is treatable, but if left untreated over time it can put you at risk for heart attack, stroke, or kidney failure. You should have your blood pressure rechecked by a primary care provider within the next 4 weeks.  If your provider in the emergency department today gave you specific instructions to follow-up with your doctor or provider even sooner than that, you should follow that instruction and not wait for up to 4 weeks for your follow-up visit.        Thank you for choosing [facility]       Thank you for choosing [facility] for your care. Our goal is always to provide you with excellent care. Hearing back from our patients is one way we can continue to improve our services. Please take a few minutes to complete the written survey that you may receive in the mail after you visit with us. Thank you!        [employer] Information     [employer] lets you send messages to your doctor, view your test results, renew your prescriptions, schedule appointments and more. To sign up, go to [email or web address] . Click on \"Log in\" on the left side of the screen, which will take you to the Welcome page. Then click on \"Sign up Now\" on the right side of the page.     You will be asked to enter the access code listed below, as well as some personal information. Please follow the directions to create your username and password.     Your access code is: [email or web address]  Expires: 2017  1:24 PM     Your access code will  in 90 days. If you need help or a new code, please call your [facility] clinic or [phone].        Care EveryWhere ID     This is your Care EveryWhere ID. This could be used by other organizations to access your [facility] medical records  [id number]        After Visit Summary       This is your record. Keep this with you and show to your community pharmacist(s) and doctor(s) at your next visit.                  "

## 2017-01-06 NOTE — ED AVS SNAPSHOT
[facility] Emergency Department    [address]    [facility] [address]    Phone:  [phone]    Fax:  [phone]                                       Sophia Zamudio   MRN: [id number]    Department:  [facility] Emergency Department   Date of Visit:  1/6/2017           After Visit Summary Signature Page     I have received my discharge instructions, and my questions have been answered. I have discussed any challenges I see with this plan with the nurse or doctor.    ..........................................................................................................................................  Patient/Patient Representative Signature      ..........................................................................................................................................  Patient Representative Print Name and Relationship to Patient    ..................................................               ................................................  Date                                            Time    ..........................................................................................................................................  Reviewed by Signature/Title    ...................................................              ..............................................  Date                                                            Time

## 2017-01-06 NOTE — ED NOTES
ABCs intact. Pt c/o cold symptoms starting Monday, which has improved. Pt c/o R sided abdominal pain since yesterday. C/o n/v/d. Denies black or bloody vomit.   Pt's home meds: see epic.

## 2017-01-07 LAB
BACTERIA SPEC CULT: ABNORMAL
Lab: ABNORMAL
MICRO REPORT STATUS: ABNORMAL
MICROORGANISM SPEC CULT: ABNORMAL
SPECIMEN SOURCE: ABNORMAL

## 2017-01-26 ENCOUNTER — TELEPHONE (OUTPATIENT)
Dept: GASTROENTEROLOGY | Facility: CLINIC | Age: 22
End: 2017-01-26

## 2017-01-26 DIAGNOSIS — R16.0 HEPATOMEGALY: Primary | ICD-10-CM

## 2017-01-26 NOTE — TELEPHONE ENCOUNTER
Left message for pt reminding them of upcoming appointment.  Instructed pt to bring updated medications list.  Instructed pt to arrive an hour and a half to two hours prior to appt time for labs.  Rick Corcoran, CMA

## 2017-01-31 ENCOUNTER — OFFICE VISIT (OUTPATIENT)
Dept: GASTROENTEROLOGY | Facility: CLINIC | Age: 22
End: 2017-01-31
Attending: INTERNAL MEDICINE
Payer: COMMERCIAL

## 2017-01-31 VITALS
BODY MASS INDEX: 18.99 KG/M2 | WEIGHT: 94.2 LBS | OXYGEN SATURATION: 98 % | DIASTOLIC BLOOD PRESSURE: 78 MMHG | SYSTOLIC BLOOD PRESSURE: 117 MMHG | HEART RATE: 57 BPM | TEMPERATURE: 98.3 F | HEIGHT: 59 IN

## 2017-01-31 DIAGNOSIS — R16.0 HEPATOMEGALY: ICD-10-CM

## 2017-01-31 DIAGNOSIS — R16.0 HEPATOMEGALY: Primary | ICD-10-CM

## 2017-01-31 LAB
ALBUMIN SERPL-MCNC: 3.9 G/DL (ref 3.4–5)
ALP SERPL-CCNC: 58 U/L (ref 40–150)
ALT SERPL W P-5'-P-CCNC: 28 U/L (ref 0–50)
ANION GAP SERPL CALCULATED.3IONS-SCNC: 10 MMOL/L (ref 3–14)
AST SERPL W P-5'-P-CCNC: 16 U/L (ref 0–45)
BILIRUB DIRECT SERPL-MCNC: 0.3 MG/DL (ref 0–0.2)
BILIRUB SERPL-MCNC: 1.6 MG/DL (ref 0.2–1.3)
BUN SERPL-MCNC: 8 MG/DL (ref 7–30)
CALCIUM SERPL-MCNC: 8.7 MG/DL (ref 8.5–10.1)
CHLORIDE SERPL-SCNC: 102 MMOL/L (ref 94–109)
CO2 SERPL-SCNC: 27 MMOL/L (ref 20–32)
CREAT SERPL-MCNC: 0.83 MG/DL (ref 0.52–1.04)
ERYTHROCYTE [DISTWIDTH] IN BLOOD BY AUTOMATED COUNT: 12.9 % (ref 10–15)
GFR SERPL CREATININE-BSD FRML MDRD: 88 ML/MIN/1.7M2
GLUCOSE SERPL-MCNC: 82 MG/DL (ref 70–99)
HCT VFR BLD AUTO: 41 % (ref 35–47)
HGB BLD-MCNC: 13.5 G/DL (ref 11.7–15.7)
INR PPP: 0.99 (ref 0.86–1.14)
MCH RBC QN AUTO: 30.7 PG (ref 26.5–33)
MCHC RBC AUTO-ENTMCNC: 32.9 G/DL (ref 31.5–36.5)
MCV RBC AUTO: 93 FL (ref 78–100)
PLATELET # BLD AUTO: 265 10E9/L (ref 150–450)
POTASSIUM SERPL-SCNC: 3.7 MMOL/L (ref 3.4–5.3)
PROT SERPL-MCNC: 8 G/DL (ref 6.8–8.8)
RBC # BLD AUTO: 4.4 10E12/L (ref 3.8–5.2)
SODIUM SERPL-SCNC: 139 MMOL/L (ref 133–144)
WBC # BLD AUTO: 6 10E9/L (ref 4–11)

## 2017-01-31 PROCEDURE — 80048 BASIC METABOLIC PNL TOTAL CA: CPT | Performed by: INTERNAL MEDICINE

## 2017-01-31 PROCEDURE — 99212 OFFICE O/P EST SF 10 MIN: CPT | Mod: ZF

## 2017-01-31 PROCEDURE — 85027 COMPLETE CBC AUTOMATED: CPT | Performed by: INTERNAL MEDICINE

## 2017-01-31 PROCEDURE — 36415 COLL VENOUS BLD VENIPUNCTURE: CPT | Performed by: INTERNAL MEDICINE

## 2017-01-31 PROCEDURE — 85610 PROTHROMBIN TIME: CPT | Performed by: INTERNAL MEDICINE

## 2017-01-31 PROCEDURE — 80076 HEPATIC FUNCTION PANEL: CPT | Performed by: INTERNAL MEDICINE

## 2017-01-31 ASSESSMENT — PAIN SCALES - GENERAL: PAINLEVEL: NO PAIN (0)

## 2017-01-31 NOTE — Clinical Note
1/31/2017      RE: Sophia Zamudio  [address]  [facility] [address]       I had the pleasure of seeing Sophia Zamudio for consultation in the Liver Clinic at the [facility] on 01/31/2017.  Ms. Zamudio presents for consultation regarding abnormal imaging of the liver.      She presented to the emergency room back in early January with abdominal pain.  She ultimately turned out to have a urinary tract infection for which she has been treated for that and the abdominal pain has resolved.  However, she had a CT scan of her abdomen that suggested that there was some hepatomegaly and abnormal appearance to the liver that was suggestive some sort of outflow obstruction.  She subsequently had a Doppler ultrasound that showed normal blood flows in the portal and hepatic veins and a normal echotexture of the liver.      As I mentioned, her abdominal pain is completely resolved.  She has no abdominal pain at this point in time.  She denies any itching, skin rash or fatigue.  She denies any dyspnea on exertion.  She denies any fevers or chills, cough or shortness of breath.  She denies any nausea, vomiting, diarrhea or constipation.  Her appetite has been good, and her weight has been stable.      She has not had any previous surgery, and she has no other medical problems.      Social History:  She does not drink any alcohol nor does she use tobacco.  She is currently taking a year off trying to decide what she wants to study.      Family History:  She is adopted.  She was born in [facility].       A complete review of systems was negative other than that noted above.     Current Outpatient Prescriptions   Medication     norethindrone-ethinyl estradiol (FEMHRT 1/5) 1-5 MG-MCG per tablet     No current facility-administered medications for this visit.     B/P: 117/78, T: 98.3, P: 57, R: Data Unavailable    HEENT exam shows no scleral icterus and no temporal muscle wasting.   Chest is clear.  Abdominal exam shows no increase in girth.  No masses or tenderness to palpation are present.  Her cardiac exam reveals no S3, S4, murmur.  Abdominal exam shows no increase in girth.  No masses or tenderness to palpation are present.  Liver is 9-10 cm in span and not palpable at the right costal margin.  No spleen tip is palpable, and extremity exam shows no edema.  Skin exam shows no stigmata of chronic liver disease.  Neurologic exam is within normal limits.     Recent Results (from the past 168 hour(s))   Hepatic panel    Collection Time: 01/31/17  7:37 AM   Result Value Ref Range    Bilirubin Direct 0.3 (H) 0.0 - 0.2 mg/dL    Bilirubin Total 1.6 (H) 0.2 - 1.3 mg/dL    Albumin 3.9 3.4 - 5.0 g/dL    Protein Total 8.0 6.8 - 8.8 g/dL    Alkaline Phosphatase 58 40 - 150 U/L    ALT 28 0 - 50 U/L    AST 16 0 - 45 U/L   CBC with platelets    Collection Time: 01/31/17  7:37 AM   Result Value Ref Range    WBC 6.0 4.0 - 11.0 10e9/L    RBC Count 4.40 3.8 - 5.2 10e12/L    Hemoglobin 13.5 11.7 - 15.7 g/dL    Hematocrit 41.0 35.0 - 47.0 %    MCV 93 78 - 100 fl    MCH 30.7 26.5 - 33.0 pg    MCHC 32.9 31.5 - 36.5 g/dL    RDW 12.9 10.0 - 15.0 %    Platelet Count 265 150 - 450 10e9/L   INR    Collection Time: 01/31/17  7:37 AM   Result Value Ref Range    INR 0.99 0.86 - 1.14   Basic metabolic panel    Collection Time: 01/31/17  7:37 AM   Result Value Ref Range    Sodium 139 133 - 144 mmol/L    Potassium 3.7 3.4 - 5.3 mmol/L    Chloride 102 94 - 109 mmol/L    Carbon Dioxide 27 20 - 32 mmol/L    Anion Gap 10 3 - 14 mmol/L    Glucose 82 70 - 99 mg/dL    Urea Nitrogen 8 7 - 30 mg/dL    Creatinine 0.83 0.52 - 1.04 mg/dL    GFR Estimate 88 >60 mL/min/1.7m2    GFR Estimate If Black >90   GFR Calc   >60 mL/min/1.7m2    Calcium 8.7 8.5 - 10.1 mg/dL      My impression is that Ms. Zamudio has CT evidence of abnormal imaging which I am not sure exactly what it represents.  Her liver tests are completely  normal with the exception of the fact that she probably has Gilbert's syndrome in that she has an elevated total bilirubin, but it is almost all indirect bilirubin.  She has no evidence of right heart failure, and her hepatic vasculature was completely patent.  She is otherwise completely normal liver tests; and thus, I really doubt that this is of any clinical consequence.      She does have Von Willebrand disease, and while the Von Willebrand spectrum is involved in DIANA-2 mutation leading to Budd-Chiari syndrome, they are really completely opposite of each other; and thus, I think they are totally unrelated.        At this point in time, I do not think it requires any additional evaluation or for that matter any followup unless symptoms change.  We did discuss what Budd-Chiari syndrome is; however, I think it is extremely unlikely that this is will develop.      Finally, I do not also think it is worth checking DIANA-2 mutation at this point in time, as it is unclear what one would do with that information even if with had it. I certainly would not initiate a course of anticoagulation.      Thank you very much for allowing me to participate in the care of this patient.  If you have any questions regarding my recommendations, please do not hesitate to contact me.       Pal Pérez MD    Professor of Medicine  [employer]    Executive Medical Director, Solid Organ Transplant Program  [facility]     Pal Pérez MD

## 2017-01-31 NOTE — PROGRESS NOTES
I had the pleasure of seeing Sophia Zamudio for consultation in the Liver Clinic at the [facility] on 01/31/2017.  Ms. Zamudio presents for consultation regarding abnormal imaging of the liver.      She presented to the emergency room back in early January with abdominal pain.  She ultimately turned out to have a urinary tract infection for which she has been treated for that and the abdominal pain has resolved.  However, she had a CT scan of her abdomen that suggested that there was some hepatomegaly and abnormal appearance to the liver that was suggestive some sort of outflow obstruction.  She subsequently had a Doppler ultrasound that showed normal blood flows in the portal and hepatic veins and a normal echotexture of the liver.      As I mentioned, her abdominal pain is completely resolved.  She has no abdominal pain at this point in time.  She denies any itching, skin rash or fatigue.  She denies any dyspnea on exertion.  She denies any fevers or chills, cough or shortness of breath.  She denies any nausea, vomiting, diarrhea or constipation.  Her appetite has been good, and her weight has been stable.      She has not had any previous surgery, and she has no other medical problems.      Social History:  She does not drink any alcohol nor does she use tobacco.  She is currently taking a year off trying to decide what she wants to study.      Family History:  She is adopted.  She was born in [facility].       A complete review of systems was negative other than that noted above.     Current Outpatient Prescriptions   Medication     norethindrone-ethinyl estradiol (FEMHRT 1/5) 1-5 MG-MCG per tablet     No current facility-administered medications for this visit.     B/P: 117/78, T: 98.3, P: 57, R: Data Unavailable    HEENT exam shows no scleral icterus and no temporal muscle wasting.  Chest is clear.  Abdominal exam shows no increase in girth.  No masses or tenderness to palpation  are present.  Her cardiac exam reveals no S3, S4, murmur.  Abdominal exam shows no increase in girth.  No masses or tenderness to palpation are present.  Liver is 9-10 cm in span and not palpable at the right costal margin.  No spleen tip is palpable, and extremity exam shows no edema.  Skin exam shows no stigmata of chronic liver disease.  Neurologic exam is within normal limits.     Recent Results (from the past 168 hour(s))   Hepatic panel    Collection Time: 01/31/17  7:37 AM   Result Value Ref Range    Bilirubin Direct 0.3 (H) 0.0 - 0.2 mg/dL    Bilirubin Total 1.6 (H) 0.2 - 1.3 mg/dL    Albumin 3.9 3.4 - 5.0 g/dL    Protein Total 8.0 6.8 - 8.8 g/dL    Alkaline Phosphatase 58 40 - 150 U/L    ALT 28 0 - 50 U/L    AST 16 0 - 45 U/L   CBC with platelets    Collection Time: 01/31/17  7:37 AM   Result Value Ref Range    WBC 6.0 4.0 - 11.0 10e9/L    RBC Count 4.40 3.8 - 5.2 10e12/L    Hemoglobin 13.5 11.7 - 15.7 g/dL    Hematocrit 41.0 35.0 - 47.0 %    MCV 93 78 - 100 fl    MCH 30.7 26.5 - 33.0 pg    MCHC 32.9 31.5 - 36.5 g/dL    RDW 12.9 10.0 - 15.0 %    Platelet Count 265 150 - 450 10e9/L   INR    Collection Time: 01/31/17  7:37 AM   Result Value Ref Range    INR 0.99 0.86 - 1.14   Basic metabolic panel    Collection Time: 01/31/17  7:37 AM   Result Value Ref Range    Sodium 139 133 - 144 mmol/L    Potassium 3.7 3.4 - 5.3 mmol/L    Chloride 102 94 - 109 mmol/L    Carbon Dioxide 27 20 - 32 mmol/L    Anion Gap 10 3 - 14 mmol/L    Glucose 82 70 - 99 mg/dL    Urea Nitrogen 8 7 - 30 mg/dL    Creatinine 0.83 0.52 - 1.04 mg/dL    GFR Estimate 88 >60 mL/min/1.7m2    GFR Estimate If Black >90   GFR Calc   >60 mL/min/1.7m2    Calcium 8.7 8.5 - 10.1 mg/dL      My impression is that Ms. Zamudio has CT evidence of abnormal imaging which I am not sure exactly what it represents.  Her liver tests are completely normal with the exception of the fact that she probably has Gilbert's syndrome in that she has an  elevated total bilirubin, but it is almost all indirect bilirubin.  She has no evidence of right heart failure, and her hepatic vasculature was completely patent.  She is otherwise completely normal liver tests; and thus, I really doubt that this is of any clinical consequence.      She does have Von Willebrand disease, and while the Von Willebrand spectrum is involved in DIANA-2 mutation leading to Budd-Chiari syndrome, they are really completely opposite of each other; and thus, I think they are totally unrelated.        At this point in time, I do not think it requires any additional evaluation or for that matter any followup unless symptoms change.  We did discuss what Budd-Chiari syndrome is; however, I think it is extremely unlikely that this is will develop.      Finally, I do not also think it is worth checking DIANA-2 mutation at this point in time, as it is unclear what one would do with that information even if with had it. I certainly would not initiate a course of anticoagulation.      Thank you very much for allowing me to participate in the care of this patient.  If you have any questions regarding my recommendations, please do not hesitate to contact me.       Pal Pérez MD    Professor of Medicine  [employer]    Executive Medical Director, Solid Organ Transplant Program  [facility]

## 2017-01-31 NOTE — NURSING NOTE
Chief Complaint   Patient presents with     Consult     Hepatomegaly   Pt roomed, vitals, meds, and allergies reviewed with pt. Pt ready for provider.  Rick Corcoran, CMA

## 2017-06-17 ENCOUNTER — HEALTH MAINTENANCE LETTER (OUTPATIENT)
Age: 22
End: 2017-06-17

## 2018-01-06 ENCOUNTER — HEALTH MAINTENANCE LETTER (OUTPATIENT)
Age: 23
End: 2018-01-06

## 2019-10-01 ENCOUNTER — HEALTH MAINTENANCE LETTER (OUTPATIENT)
Age: 24
End: 2019-10-01

## 2021-01-15 ENCOUNTER — HEALTH MAINTENANCE LETTER (OUTPATIENT)
Age: 26
End: 2021-01-15

## 2021-09-04 ENCOUNTER — HEALTH MAINTENANCE LETTER (OUTPATIENT)
Age: 26
End: 2021-09-04

## 2021-10-02 ENCOUNTER — OFFICE VISIT (OUTPATIENT)
Dept: URGENT CARE | Facility: URGENT CARE | Age: 26
End: 2021-10-02
Payer: COMMERCIAL

## 2021-10-02 VITALS
HEART RATE: 95 BPM | SYSTOLIC BLOOD PRESSURE: 106 MMHG | OXYGEN SATURATION: 96 % | WEIGHT: 105 LBS | DIASTOLIC BLOOD PRESSURE: 80 MMHG | TEMPERATURE: 99.2 F | BODY MASS INDEX: 21.21 KG/M2

## 2021-10-02 DIAGNOSIS — R50.9 FEVER IN ADULT: ICD-10-CM

## 2021-10-02 DIAGNOSIS — J03.90 TONSILLITIS: ICD-10-CM

## 2021-10-02 DIAGNOSIS — R07.0 THROAT PAIN: Primary | ICD-10-CM

## 2021-10-02 LAB — DEPRECATED S PYO AG THROAT QL EIA: NEGATIVE

## 2021-10-02 PROCEDURE — U0003 INFECTIOUS AGENT DETECTION BY NUCLEIC ACID (DNA OR RNA); SEVERE ACUTE RESPIRATORY SYNDROME CORONAVIRUS 2 (SARS-COV-2) (CORONAVIRUS DISEASE [COVID-19]), AMPLIFIED PROBE TECHNIQUE, MAKING USE OF HIGH THROUGHPUT TECHNOLOGIES AS DESCRIBED BY CMS-2020-01-R: HCPCS | Performed by: FAMILY MEDICINE

## 2021-10-02 PROCEDURE — 99203 OFFICE O/P NEW LOW 30 MIN: CPT | Performed by: FAMILY MEDICINE

## 2021-10-02 PROCEDURE — 87651 STREP A DNA AMP PROBE: CPT | Performed by: FAMILY MEDICINE

## 2021-10-02 PROCEDURE — U0005 INFEC AGEN DETEC AMPLI PROBE: HCPCS | Performed by: FAMILY MEDICINE

## 2021-10-02 RX ORDER — AMOXICILLIN 500 MG/1
500 CAPSULE ORAL 3 TIMES DAILY
Qty: 30 CAPSULE | Refills: 0 | Status: SHIPPED | OUTPATIENT
Start: 2021-10-02 | End: 2021-10-12

## 2021-10-02 NOTE — PATIENT INSTRUCTIONS
Patient Education     Tonsillitis in Adults  Tonsillitis is swelling and redness (inflammation) of the tonsils. It happens when the tonsils are infected by a virus or a bacteria. Your tonsils are 2 pink, oval lymph glands at the back of your throat. They are part of your immune system, which helps your body fight infection. They react when germs get inside your nose and mouth.  Tonsillitis is very common. It is most often seen in children, but it can also occur in young adults.  The viruses and bacteria that cause tonsillitis can be easily passed from one person to another.    What causes tonsillitis?  Tonsillitis is most often caused by a virus.  Common viruses that cause tonsillitis include:    Cold viruses    Adenoviruses    Jennifer-Barr virus    Infectious mononucleosis    Herpes simplex virus (HSV)    Cytomegalovirus    Measles  In some cases tonsillitis is caused by a bacteria. Bacterial tonsillitis is often called strep throat. The most common type of bacteria that causes tonsillitis is GABS (Group A beta-hemolytic streptococcus). The bacteria is spread through droplets in the air. This happens when someone with the virus coughs or sneezes. It can also be spread by sharing food or drinks.  Symptoms of tonsillitis  Symptoms will depend on which type of tonsillitis you have. There are several types of tonsillitis.  Acute tonsillitis  Symptoms for this type often go away in a few days. But they can last up to 2 weeks. In some cases symptoms come back after treatment is done (acute recurrent tonsillitis). Symptoms include:    Fever    Sore throat    Bad breath    Trouble swallowing    Fluid loss (dehydration)    Sore lymph nodes in the neck    Tiredness    Snoring, sleep apnea, or breathing through the mouth    White patches, pus, or red tonsils    A red rash on the body  Chronic tonsillitis  For this type, the infection or inflammation lasts for a few months. Symptoms include:    Lasting sore throat    Bad  breath    Lasting sore lymph nodes in the neck    Bacteria and debris collecting on the tonsils (called tonsil stones)  Peritonsillar abscess  This is a severe form of tonsillitis. It occurs when a pocket of pus (an abscess) forms around the tonsil. You need treatment right away. This can help stop the abscess from blocking your airway. Symptoms include:    Severe throat pain    Trouble opening the mouth    Drooling    Voice sounds muffled    One tonsil may look larger  Diagnosing tonsillitis  If you have symptoms, see your primary healthcare provider. Or see an ear, nose, and throat doctor (ENT or otolaryngologist).  The provider will ask about your symptoms. He or she will also check your ear, nose, and throat for any swelling and infection. The provider will then swab your tonsils or the back of your throat. This sample can be checked in the provider s office for strep throat. This is called a rapid strep test. Results are ready in a few minutes. But there can be false negatives with this test. So the provider will likely also send the sample out to a lab for testing (throat culture). The lab results will take 24 hours or longer. But a throat culture is more accurate.  Treatment for tonsillitis  Treatment will depend on what is causing the tonsillitis. If it s caused by bacteria, then your provider may prescribe antibiotics to help you recover. Finish all of the medicine even if you start to feel better.  Tonsillitis caused by a virus can t be treated with antibiotics. This kind of infection often goes away on its own. Home care may be all that you need, with rest and fluids. Follow these tips to help ease your symptoms at home:    Get plenty of rest.    Drink lots of fluids, such as soup, broth, and tea with honey and lemon.    East soft foods such as ice cream, applesauce, and flavored gelatins.    Gargle with warm saltwater.    Use over-the-counter throat sprays or lozenges for throat pain.    Take  over-the-counter medicine for fever and pain, as directed.    Use a cool-mist humidifier to keep the air moist.  In severe cases, a person may be dehydrated or have a blocked airway. They may need to be hospitalized.  Surgery to remove the tonsils (tonsillectomy) may be needed if you have any of these:    Chronic tonsillitis    Tonsillitis that keeps coming back    Obstructive sleep apnea    Acute recurrent tonsillitis  If you have a peritonsillar abscess, surgery may be done to drain the abscess.  Preventing tonsillitis  Tonsillitis itself can t spread. But the virus and bacteria that cause it can be passed to other people.  No vaccine or medicine can prevent tonsillitis. These tips can help keep you from spreading or catching an illness that can cause tonsillitis:    Stay away from anyone with tonsillitis or a sore throat as much as possible.    Don't share utensils, drinking glasses, toothbrushes, or other personal objects with anyone who has tonsillitis or a sore throat.    Wash your hands correctly. Wash them often with soap and water often. Use hand  when you can t wash your hands.    Cover your mouth when you cough or sneeze.  Call 911  Call 911 if you have any of these symptoms:    Trouble breathing or speaking    Trouble swallowing or opening your mouth    Swollen mouth and throat    Drooling  When to call your healthcare provider  Call your healthcare provider if you have any of these symptoms:    Fever of 100.4 F (38 C) or higher, or as directed by your provider    A lump that gets larger    Worsening throat pain or neck pain    Unable to open your mouth fully (called lockjaw or trismus)    Neck stiffness    Bleeding    Painful swallowing    Feeling very ill or sick    Sore throat for more than 2 days     Kurt last reviewed this educational content on 7/1/2019 2000-2021 [employer]. All rights reserved. This information is not intended as a substitute for professional medical  care. Always follow your healthcare professional's instructions.

## 2021-10-02 NOTE — PROGRESS NOTES
Chief Complaint   Patient presents with     Urgent Care     Pharyngitis     throat pain, headache, fever thursday and yesterday 100.5        Medical Decision Making:    ASSESMENT AND PLAN   Sophia was seen today for urgent care and pharyngitis.    Diagnoses and all orders for this visit:    Throat pain  -     Streptococcus A Rapid Screen w/Reflex to PCR - Clinic Collect  -     Symptomatic COVID-19 Virus (Coronavirus) by PCR  -     Group A Streptococcus PCR Throat Swab    Tonsillitis  -     amoxicillin (AMOXIL) 500 MG capsule; Take 1 capsule (500 mg) by mouth 3 times daily for 10 days    Fever in adult          Tylenol, Fluids, Saline gargles and Vaporizer  Routine discharge counseling was given to the patient and the patient understands that worsening, changing or persistent symptoms should prompt an immediate call or follow up with their primary physician or the emergency department. The importance of appropriate follow up was also discussed with the patient.     I have reviewed the nursing notes.    Differential Diagnosis:  URI Adult/Peds:  Sinusitis, Strep pharyngitis, Tonsilitis, Viral pharyngitis, Viral syndrome and Viral upper respiratory illness    Review of the result(s) of each unique test -     X-Ray was not done.    Time  spent on the date of the encounter doing chart review, review of test results, interpretation of tests, patient visit and documentation     see orders in Epic  Pt verbalized and agreed with the plan and is aware of the worsening symptoms for which would need to follow up .  Pt was stable during time of discharge from the clinic     SUBJECTIVE     Sophia Zamudio is a 25 year old female presenting with a chief complaint of    Chief Complaint   Patient presents with     Urgent Care     Pharyngitis     throat pain, headache, fever thursday and yesterday 100.5            Sophia Zamudio is a 25 year old female presenting with a chief complaint of fever and sore throat. She is an  established patient of [facility].  Onset of symptoms was 1 day(s) ago.she is not vaccinated against covid   Course of illness is worsening.    Severity moderate  Current and Associated symptoms: sore throat  Treatment measures tried include None tried.  Predisposing factors include None.    No past medical history on file.  Current Outpatient Medications   Medication Sig Dispense Refill     amoxicillin (AMOXIL) 500 MG capsule Take 1 capsule (500 mg) by mouth 3 times daily for 10 days 30 capsule 0     norethindrone-ethinyl estradiol (FEMHRT 1/5) 1-5 MG-MCG per tablet Take 1 tablet by mouth daily       Social History     Tobacco Use     Smoking status: Never Smoker   Substance Use Topics     Alcohol use: No     No family history on file.      ROS:    10 point ROS of systems including  Eyes, Respiratory, Cardiovascular, Gastroenterology, Genitourinary, Integumentary, Muscularskeletal, Psychiatric ,neurological were all negative except for pertinent positives noted in my HPI         OBJECTIVE:    /80 (BP Location: Right arm)   Pulse 95   Temp 99.2  F (37.3  C) (Tympanic)   Wt 47.6 kg (105 lb)   SpO2 96%   BMI 21.21 kg/m    GENERAL APPEARANCE: healthy, alert and no distress  EYES: EOMI,  PERRL, conjunctiva clear  HENT: ear canals and TM's normal.  Nose and mouth without ulcers, bilateral tonsillar enlargement positive for erythema with exudate   NECK: supple, tender, swollen glands   RESP: lungs clear to auscultation - no rales, rhonchi or wheezes  CV: regular rates and rhythm, normal S1 S2, no murmur noted  SKIN: no suspicious lesions or rashes  PSYCH: mentation appears normal  Physical Exam      (Note was completed, in part, with [employer] voice-recognition software. Documentation reviewed, but some grammatical, spelling, and word errors may remain.)  Jazmín Oliva MD on 10/2/2021 at 4:20 PM

## 2021-10-03 LAB — GROUP A STREP BY PCR: NOT DETECTED

## 2021-10-04 LAB — SARS-COV-2 RNA RESP QL NAA+PROBE: NEGATIVE

## 2022-02-19 ENCOUNTER — HEALTH MAINTENANCE LETTER (OUTPATIENT)
Age: 27
End: 2022-02-19

## 2022-10-22 ENCOUNTER — HEALTH MAINTENANCE LETTER (OUTPATIENT)
Age: 27
End: 2022-10-22

## 2023-04-01 ENCOUNTER — HEALTH MAINTENANCE LETTER (OUTPATIENT)
Age: 28
End: 2023-04-01

## 2024-06-01 ENCOUNTER — HEALTH MAINTENANCE LETTER (OUTPATIENT)
Age: 29
End: 2024-06-01

## 2024-06-24 ENCOUNTER — HOSPITAL ENCOUNTER (EMERGENCY)
Facility: CLINIC | Age: 29
Discharge: HOME | End: 2024-06-24
Attending: EMERGENCY MEDICINE | Admitting: EMERGENCY MEDICINE
Payer: MEDICAID

## 2024-06-24 VITALS
HEART RATE: 72 BPM | RESPIRATION RATE: 19 BRPM | HEIGHT: 59 IN | OXYGEN SATURATION: 97 % | WEIGHT: 98.3 LBS | DIASTOLIC BLOOD PRESSURE: 71 MMHG | SYSTOLIC BLOOD PRESSURE: 110 MMHG | BODY MASS INDEX: 19.82 KG/M2 | TEMPERATURE: 97.8 F

## 2024-06-24 DIAGNOSIS — F11.23 OPIOID DEPENDENCE WITH WITHDRAWAL (H): ICD-10-CM

## 2024-06-24 PROBLEM — F43.20 ADJUSTMENT REACTION: Status: ACTIVE | Noted: 2024-06-24

## 2024-06-24 PROBLEM — F11.90 OPIATE USE: Status: ACTIVE | Noted: 2024-06-24

## 2024-06-24 PROCEDURE — 99285 EMERGENCY DEPT VISIT HI MDM: CPT

## 2024-06-24 PROCEDURE — 250N000013 HC RX MED GY IP 250 OP 250 PS 637: Performed by: EMERGENCY MEDICINE

## 2024-06-24 PROCEDURE — 250N000011 HC RX IP 250 OP 636: Performed by: EMERGENCY MEDICINE

## 2024-06-24 RX ORDER — ONDANSETRON 4 MG/1
4 TABLET, ORALLY DISINTEGRATING ORAL ONCE
Status: COMPLETED | OUTPATIENT
Start: 2024-06-24 | End: 2024-06-24

## 2024-06-24 RX ORDER — CLONIDINE HYDROCHLORIDE 0.1 MG/1
0.1 TABLET ORAL ONCE
Status: COMPLETED | OUTPATIENT
Start: 2024-06-24 | End: 2024-06-24

## 2024-06-24 RX ADMIN — CLONIDINE HYDROCHLORIDE 0.1 MG: 0.1 TABLET ORAL at 12:45

## 2024-06-24 RX ADMIN — ONDANSETRON 4 MG: 4 TABLET, ORALLY DISINTEGRATING ORAL at 12:45

## 2024-06-24 ASSESSMENT — COLUMBIA-SUICIDE SEVERITY RATING SCALE - C-SSRS
3. HAVE YOU BEEN THINKING ABOUT HOW YOU MIGHT KILL YOURSELF?: NO
1. IN THE PAST MONTH, HAVE YOU WISHED YOU WERE DEAD OR WISHED YOU COULD GO TO SLEEP AND NOT WAKE UP?: YES
4. HAVE YOU HAD THESE THOUGHTS AND HAD SOME INTENTION OF ACTING ON THEM?: YES
6. HAVE YOU EVER DONE ANYTHING, STARTED TO DO ANYTHING, OR PREPARED TO DO ANYTHING TO END YOUR LIFE?: NO
5. HAVE YOU STARTED TO WORK OUT OR WORKED OUT THE DETAILS OF HOW TO KILL YOURSELF? DO YOU INTEND TO CARRY OUT THIS PLAN?: NO
2. HAVE YOU ACTUALLY HAD ANY THOUGHTS OF KILLING YOURSELF IN THE PAST MONTH?: YES

## 2024-06-24 ASSESSMENT — ACTIVITIES OF DAILY LIVING (ADL)
ADLS_ACUITY_SCORE: 35
ADLS_ACUITY_SCORE: 35

## 2024-06-24 NOTE — ED NOTES
Bed: ED04  Expected date: 6/24/24  Expected time: 10:13 AM  Means of arrival:   Comments:  MH Only

## 2024-06-24 NOTE — ED PROVIDER NOTES
"    History     Chief Complaint:  Fentanyl dependence       HPI   Sophia Zamudio is a 28 year old female who presents with fentanyl dependence and requesting detox help.  Patient states that she has been using fentanyl for approximately the last 4 years, with very few if any days in remission.  Her last use was this morning, and the patient continued to have very little high from the fentanyl and evolving symptoms of withdrawal.  She determined to seek therapy today.  She endorses mild headache and nausea but denies any other concerns.        Independent Historian:   None    Review of External Notes:   Reviewed 10/2/2021 office visit    ROS:  Review of Systems    Allergies:  No Known Allergies     Medications:    norethindrone-ethinyl estradiol (FEMHRT 1/5) 1-5 MG-MCG per tablet        Physical Exam     Patient Vitals for the past 24 hrs:  Vitals:    06/24/24 1210 06/24/24 1328   BP: (!) 115/90 110/71   Pulse: 75 72   Resp: 20 19   Temp: 99.8  F (37.7  C) 97.8  F (36.6  C)   TempSrc: Temporal Oral   SpO2: 98% 97%   Weight: 44.6 kg (98 lb 4.8 oz)    Height: 1.499 m (4' 11\")          Physical Exam  Constitutional: Alert, attentive  HENT:    Nose: Nose normal.    Mouth/Throat: Oropharynx is clear, mucous membranes are moist   Eyes: EOM are normal.   CV: regular rate and rhythm; no murmurs, rubs or gallups  Chest: Effort normal and breath sounds normal.   GI:  There is no tenderness. No distension. Normal bowel sounds  MSK: Normal range of motion.   Neurological: Alert, attentive  Skin: Skin is warm and dry.      Emergency Department Course     Emergency Department Course & Assessments:       Interventions:  Medications   ondansetron (ZOFRAN ODT) ODT tab 4 mg (4 mg Oral $Given 6/24/24 1245)   cloNIDine (CATAPRES) tablet 0.1 mg (0.1 mg Oral $Given 6/24/24 1245)     Consultations/Discussion of Management or Tests:  Consulted with DEC regarding positive C-SSRS screen.  Patient states that she has not felt suicidal for " approximately 1 month and is not suicidal today.  DEC consult and agrees the patient is safe for medical clearance and does not need psychiatric admission today.  Patient provided multiple resources for detox therapy.    Disposition:  The patient was discharged to home.     Impression & Plan        Medical Decision Making:  This a pleasant 28-year-old female with unfortunate history of opioid use disorder with mild symptoms of withdrawal, improved with supportive cares.  She is not currently suicidal and is safe for outpatient management.  DEC is seen the patient and concurs.  Ample resources given for detox therapy and the patient will be discharged to the care of her supportive mother.  Plan primary care follow-up in 1 week as needed and return precautions for any concerns.      Diagnosis:  Visit Diagnosis, Associated Orders, and Comments     ICD-10-CM    1. Opioid dependence with withdrawal (H)  F11.23              Pal Westbrook MD  06/24/24 7101

## 2024-06-24 NOTE — ED NOTES
Pt changed into behavioral scrubs. Belongings secured in bag and placed in DEC office. Pt calm and cooperative. Mom at bedside. VDEC started.

## 2024-06-24 NOTE — ED TRIAGE NOTES
"  Pt reports 4-4.5 year addiction to fentanyl, states she takes the \"blue 30s\". States she is an addict and wants help. Reports taking xanax \"the past few days\" to help with withdrawal. C/o head, back pain and stomach ache.    Triage Assessment (Adult)       Row Name 06/24/24 1211          Triage Assessment    Airway WDL WDL        Respiratory WDL    Respiratory WDL WDL        Skin Circulation/Temperature WDL    Skin Circulation/Temperature WDL WDL        Cardiac WDL    Cardiac WDL WDL        Peripheral/Neurovascular WDL    Peripheral Neurovascular WDL WDL        Cognitive/Neuro/Behavioral WDL    Cognitive/Neuro/Behavioral WDL WDL                     "

## 2024-06-24 NOTE — DISCHARGE INSTRUCTIONS
1) It is recommended that you go to Detox. The following are detox facilities where you can seek services.   -[facility]: [address], [place], [address] [phone]   -[facility]: [address], [place], [address]  [phone]   -[facility]: [address], [place], [id number] [phone]   - [facility] [address], [place], [address] [phone]     2) Please go to [email or web address] to apply for health insurance.     3) Once you detox it is recommended that you have an evaluation for treatment and go to treatment.   Please contact your [facility] of residence for eligibility screen to substance use disorder evaluation and treatment:  [place] - [phone]   [facility] [phone]   [facility] [phone]   [facility] [phone]   [facility] [phone]   [facility] [phone]   [facility] [phone]   [place] - [phone]     4) Below are some providers you can call for evaluation if needed:    [facility] Evaluations: Clients may call their county for a full list of providers - Availability and services listed belo are subject to change, please call the provider to confirm    [facility]  [phone]  [address] [place] [address], [address]  *Please call the above number to schedule a comprehensive assessment for determination of level of care needs. In person and virtual appointments available Mon-Fri.    [facility], [address], [address] Floor, [address], [place] [address] (next to the outpatient lab)    Phone: [phone]   Provides bridging services to people with Opiate Use Disorders (OUD) seeking care. This is a front door to Medication Assisted Treatments (MAT), ages 16+  Walk In hours: Monday-Friday 9:00am-3:00pm    [facility]  [phone]  Walk in Assessments: Mon-Friday 7a-1:45p  [address],  [place], [address]    [facility] [phone]  [address], [place], [address]  *by appointment only    [place]  [phone] (phone consultation available )  Locations in: [place], [place], [place], [place], and [place], [place]  [place] virtual IOP [phone] or visit [email or web address]   Also offers LGBTQ programming     [facility]  [phone]  [address]  [place], [place], [address]  *Currently only offered via telehealth - call to set up an appointment    [facility]  [address] [place] [address], [address]  [facility]  For more information to to make a referral call:  [phone]  Walk-in on   9-11 a.m.    [facility]  [phone]  [address], [place], [address]  *available by appointments only    [facility]  [phone]  [address] [place], [place], [address]  *available by appointment only    [facility]  [phone]  [address] [place], [place], [address]  *walk in assessments available M-F starting at 7 am.    [facility]  [phone]  Locations: [facility] [place], [facility], and [place]  *Walk in assessments availble M-F starting at 8 am -virtual only    [facility]  Virtual + Locations: [place], [place], [place], [place], [facility], [facility], [place], [place]   [phone]  *available by appointment only    [facility]  [phone]  [address] [place], [place], [address]  *available by appointment    [facility]  MAT program: [phone]  [address] [place], [place], [address]    [place]  [phone]  Same day substance use disorder assessments are available Monday - Friday, via walk-in or by appointment at the [place] location.  [address], [address], [place] [address]      [facility] - adolescent and adult SUDs services  [phone]  Offer services Monday through Friday, as well as evening hours Monday through Thursday. Normally, a first appointment will be scheduled within one week  [email or web address]  Locations all over [place]    Ways to help cope with sobriety:    -- Take prescribed medicines as scheduled  -- Keep follow-up appointments  -- Talk to others about your concerns  -- Get regular exercise  -- Practice deep breathing skills  -- Eat a healthy diet  -- Use community resources, including hotline numbers, [facility] crisis and support meetings  -- Stay sober and avoid places/people/things associated with substance use  --Maintain a daily schedule/routine  --Get at least 7-8 hours of sleep per night  --Create a list 10--20 healthy activities that you can do that are enjoyable and do not involve substance use  --Create daily goals (approx. 1-4 goals) per day and work to achieve them throughout the day.       Free Resources:    [place] [facility] ([facility])  [facility] connects people seeking recovery to resources that help foster and sustain long-term recovery. Whether you are seeking resources for treatment, transportation, housing, job training, education, health care or other pathways to recovery, [facility] is a great place to start.  Phone: 310.758.1086. www.[place]ant.[employer] Combined (Great listing of all types of recovery and non-recovery related resources)    Alcoholics Anonymous  Phone: 8-119-ALCOHOL  Website: HTTP://WWW.AA.ORG/  [facility] [place] (369-816-4877 or http://a[place].org)  [facility] [place] (149-531-7660 or www.aastpaul.org)     Narcotics Anonymous  Phone: 544.869.3087  Website: www.Esc[employer]ia.[employer]Stream.    [facility]  [address] [place] [address], [address], [place], [place],  Phone: [phone]  Drop-in Hours: Monday-Friday 9-11:30 am. By appointment at other times.  Provides: [facility] is a drop-in  center on the east side [facility] that provides a safe space for individuals who are homeless and have a history of chemical use. Sobriety is not a requirement but drugs and alcohol are not allowed on the property.  Services: Non-clients can access drop-in services such as Recovery and Harm Reduction Groups, referrals to case management, community activities, shower facilities, and a pool table. Individuals who are homeless and have chemical health needs may be eligible for enrollment into [facility]'s case management program. Clients and  work together to access benefits, treatment, health care, shelter, and external housing resources.

## 2024-06-24 NOTE — CONSULTS
Diagnostic Evaluation Consultation  Crisis Assessment    Patient Name: Sophia Zamudio  Age:  28 year old  Legal Sex: female  Gender Identity: female  Pronouns: she/her  Race: White  Ethnicity:  or   Language: English      Patient was assessed: Virtual: [place]   Crisis Assessment Start Date: 06/24/24  Crisis Assessment Start Time: 1247  Crisis Assessment Stop Time: 1317  Patient location: [facility] EMERGENCY DEPT                             ED04    Referral Data and Chief Complaint  Sophia Zamudio presents to the ED with family/friends. Patient is presenting to the ED for the following concerns: Substance use.       Factors that make the mental health crisis life threatening or complex are:  Today the patient asked her mother to drive her to [facility] emergency department for an evaluation of opioid use. The patient reports using fentanyl daily for the past four years and xanax over the past few weeks. Last use was this morning. Dosages are unknown. The patient reports a few days without use (last day without use was a month ago) in which she experienced withdrawal symptoms of shaking, sweating, anxiety, and diarrhea. She has no history of seizures.     Currently she is calm, cooperative, alert, oriented, able to provide a history, and engaged in assessment. She currently denies suicidal or homicidal ideation, intent, or plan. She is future thinking, listed several reasons to keep living, has multiple protective factors, has good support from her mom and her boyfriend, and has a goal of going to detox and then substance use treatment.    Informed Consent and Assessment Methods  Explained the crisis assessment process, including applicable information disclosures and limits to confidentiality, assessed understanding of the process, and obtained consent to proceed with the assessment.  Assessment methods included conducting a formal interview with patient, review of medical  records, collaboration with medical staff, and obtaining relevant collateral information from family and community providers when available: done     Patient response to interventions: acceptance expressed  Coping skills were attempted to reduce the crisis:  Came to ED     History of the Crisis   The patient reports that she started using fentanyl daily four years ago when the pandemic started and she had to stay home from work. Her fentanyl use impacted her functioning and she wasn't able to return to work. She has been homeless for the last several years and living in a hotel with her boyfriend. She has never had a substance use evaluation or substance use treatment. She reports no history of mental health diagnosis, no history of mental health admissions, and has never taken medicines for mental health. She is her own legal guardian and has no history of commitment.    Brief Psychosocial History  Family:  Domestic Partnership, Children no  Support System:  Significant Other, Parent(s)  Employment Status:  unemployed  Source of Income:  none  Financial Environmental Concerns:  unemployed  Current Hobbies:  interaction with pets, social media/computer activities  Barriers in Personal Life: substance use     Significant Clinical History  Current Anxiety Symptoms:  anxious  Current Depression/Trauma:  none reported currently but reports some symptoms during withdrawal  Current Somatic Symptoms:  none reported  Current Psychosis/Thought Disturbance: none reported  Current Eating Symptoms:     Chemical Use History:  Alcohol: None  Benzodiazepines: None  Opiates: Rx abuse  Last Use:: 06/24/24  Cocaine: None  Marijuana: past use  Other Use: None  Withdrawal Symptoms: Nausea, shaking, sweating  Past diagnosis:  No known past diagnosis  Family history:  No known history of mental health or chemical health concerns (adopted)  Past treatment:  No known formal treatment attempts  Details of most recent treatment:   None    Collateral Information  Is there collateral information: Yes     Collateral information name, relationship, phone number:  Patient is accompanied by her mother who appears very supportive. Mother agrees with patients report. Mother did not identify any safety concerns. Mother is very supportive and is able to help get patient to detox.     Risk Assessment  Lee Suicide Severity Rating Scale Full Clinical Version: 6/24/2024  Suicidal Ideation  Q1 Wish to be Dead (Lifetime): Yes  Q2 Non-Specific Active Suicidal Thoughts (Lifetime): No  3. Active Suicidal Ideation with any Methods (Not Plan) Without Intent to Act (Lifetime): No  Q4 Active Suicidal Ideation with Some Intent to Act, Without Specific Plan (Lifetime): No  Q5 Active Suicidal Ideation with Specific Plan and Intent (Lifetime): No  Q6 Suicide Behavior (Lifetime): no     Suicidal Behavior (Lifetime)  Actual Attempt (Lifetime): No    Lee Suicide Severity Rating Scale Recent: 6/24/2024  Suicidal Ideation (Recent)  Q1 Wished to be Dead (Past Month): no  Q2 Suicidal Thoughts (Past Month): no  Q3 Suicidal Thought Method: no  Q4 Suicidal Intent without Specific Plan: no  Q5 Suicide Intent with Specific Plan: no  Level of Risk per Screen: no risks indicated     Suicidal Behavior (Recent)  Actual Attempt (Past 3 Months): No    Environmental or Psychosocial Events: ongoing abuse of substances  Protective Factors: Protective Factors: strong bond to family unit, community support, or employment, sense of importance of health and wellness, help seeking, optimistic outlook - identification of future goals    Does the patient have thoughts of harming others? Feels Like Hurting Others: no  Previous Attempt to Hurt Others: no  Is the patient engaging in sexually inappropriate behavior?: no    Is the patient engaging in sexually inappropriate behavior?  no        Mental Status Exam   Affect: Appropriate  Appearance: Appropriate  Attention Span/Concentration:  Attentive  Eye Contact: Engaged    Fund of Knowledge: Appropriate   Language /Speech Content: Fluent  Language /Speech Volume: Normal  Language /Speech Rate/Productions: Normal  Recent Memory: Intact  Remote Memory: Intact  Mood: Normal  Orientation to Person: Yes   Orientation to Place: Yes  Orientation to Time of Day: Yes  Orientation to Date: Yes     Situation (Do they understand why they are here?): Yes  Psychomotor Behavior: Normal  Thought Content: Clear  Thought Form: Intact     Medication  Psychotropic medications: None    Current Care Team  Patient Care Team:  Nik Lombardo MD as PCP - General (Pediatrics)    Diagnosis  Patient Active Problem List   Diagnosis Code    Adjustment reaction F43.20    Opiate use F11.90     Primary Problem This Admission  Active Hospital Problems    *F43.2 Adjustment reaction      F11.90 Opiate use    Clinical Summary and Substantiation of Recommendations   Today the patient asked her mother to drive her to [facility] emergency department for an evaluation of opioid use. She reports past withdrawal symptoms of shaking and sweating; no history of seizures. She currently denies suicidal or homicidal ideation, intent, or plan. She is future thinking, listed several reasons to keep living, has multiple protective factors, has good support from her mom and her boyfriend, and has a goal of going to detox and then substance use treatment. Call [facility] detox several times but they did not answer. Patient was provided a list of detox locations, information about [facility], phone numbers to get a substance use evaluation, and resources for treatment. Patient was discharged with her mother.    Patient coping skills attempted to reduce the crisis:  Came to ED    Disposition  Recommended disposition: Substance Abuse Disorder Treatment        Reviewed case and recommendations with attending provider. Attending Name: MD Pietro       Attending concurs with disposition: yes        Patient and/or validated legal guardian concurs with disposition: yes       Final disposition:  discharge     Assessment Details   Total duration spent with the patient: 30 min     CPT code(s) utilized: 33990 - Psychotherapy for Crisis - 60 (30-74*) min    LILIYA Richard   [facility] - Triage & Transition Services   Callback: [phone]

## 2024-11-08 ENCOUNTER — LAB REQUISITION (OUTPATIENT)
Dept: LAB | Facility: CLINIC | Age: 29
End: 2024-11-08
Payer: COMMERCIAL

## 2024-11-08 DIAGNOSIS — Z13.220 ENCOUNTER FOR SCREENING FOR LIPOID DISORDERS: ICD-10-CM

## 2024-11-08 PROCEDURE — 80061 LIPID PANEL: CPT | Mod: ORL | Performed by: STUDENT IN AN ORGANIZED HEALTH CARE EDUCATION/TRAINING PROGRAM

## 2024-11-09 LAB
CHOLEST SERPL-MCNC: 162 MG/DL
FASTING STATUS PATIENT QL REPORTED: ABNORMAL
HDLC SERPL-MCNC: 48 MG/DL
LDLC SERPL CALC-MCNC: 93 MG/DL
NONHDLC SERPL-MCNC: 114 MG/DL
TRIGL SERPL-MCNC: 105 MG/DL

## 2025-08-16 ENCOUNTER — HEALTH MAINTENANCE LETTER (OUTPATIENT)
Age: 30
End: 2025-08-16